# Patient Record
(demographics unavailable — no encounter records)

---

## 2024-10-14 NOTE — ASSESSMENT
[FreeTextEntry1] : 74 year old M with HTN, HLD, Afib (likely long-standing persistent as per pt), CAD s/p stent in 2014, HFpEF, DM who presents for f/u.  1) HFpEF, - His initial presentation was likely acute on chronic HFpEF exacerbation related to diuretic underdosing +/- slow Afib and significant valvular disease. His severe TR and moderate MR are likely due to annular dilation in setting of longstanding Afib. His symptoms/volume status have improved with Lasix/aldactone and stopping of nebivolol. - TTE 12/14/23 showed normal LVEF 65%, RV enlargement with probably normal function, biatrial enlargement, mild-mod AI, moderate MR, and severe TR with PASP 39mmHg. - Continue Lasix 40mg with spironolactone 25mg daily - Given bradycardia, continue to hold nebivolol to allow for increased HR to generate more forward flow - GDMT for HFpEF: Aldactone 25mg daily, Farxiga 10mg daily. He is on Losartan 100mg daily. - Continue amlodipine 5mg daily for HTN  2) Afib, likely longstanding persistent 3) CAD s/p stent (in 2014, last [facility] 2023 reportedly non-obstructive with Dr. Morel) - On Eliquis 5mg PO BID for stroke prevention - 1 week event monitor showed 100% Afib, 33-96, avg 59 bpm, 6 pauses noted (longest 2.8 s in Afib), rare PVCs - Continue to hold Nebivolol as above - He is asymptomatic without SOB or palpitations. His volume status is well-controlled. Will consider EP evaluation in the future. - Continue ASA 81mg daily and pitavastatin 2mg daily  4) Follow-up, 3 months or sooner if symptomatic

## 2024-10-14 NOTE — HISTORY OF PRESENT ILLNESS
[FreeTextEntry1] : Pt remains stable since last visit. No CP, SOB, LE edema, orthopnea, PND, palpitations, dizziness, or LOC. Still on Lasix 40/spironolactone 25. BUN/Cr 42/1.4 which is stable.  7/15/24 - Pt remains stable for the past 3 months. No CP, SOB, LE edema, orthopnea, PND, palpitations, dizziness, or LOC. He feels well overall on Lasix 40/cyndee 25. He tried reducing Lasix to 20mg in the past but that led to increase in edema.  4/15/24 - He has been stable for the last 2 months. He is on Lasix 40/spironolactone 25 daily. He reports minimal LE swelling. His weight is stable. He denies CP or SOB. He feels like he can walk at least 1 hour with his cane now, which is much improved from before. No palpitations, dizziness, or LOC.  2/21/24 - He has been eating more salty foods during [place]. He was taking Lasix 20 PO daily and spironolactone 12.5mg daily. He noticed his weight climbing and his LE edema increasing so he increased Lasix back to 40mg and spironolactone back to 25mg daily for the past 10 days. He noticed improvement of swelling since then. His weight today is 153-154 lbs. He has no chest pain, SOB, orthopnea or PND.  1/24/24 - CMP with K 6.1, BUN/Cr 105/2.34, proBNP still elevated 2006. Pt was advised to stop KCl tablets and hold spironolactone x 2 days and continue Lasix 20mg PO daily. Pt feels okay today without CP or SOB. His weight is 148 lbs today. No palpitations, dizziness, or LOC.  1/19/24 - He is back to his baseline exercise tolerance, walking around slowly with a cane without chest pain or SOB. He is now 147 lbs down from 152 lbs last visit and 160 lbs from initial visit. No orthopnea, PND, dizziness, or LOC.  12/21/23 - He feels much improved since increasing Lasix to 40mg PO daily and stopping nebivolol. He lost 10 lbs with less abdominal and LE swelling. He denies SOB, orthopnea, PND. He now has trace LE edema.  12/14/23 -Pt was hospitalized 4/19/23-4/24/23 for SOB. He reportedly had stress test with Dr. Morel at that time and became SOB. EKG showed Afib with bradycardia. Pt was treated for ADHF. He was given Lasix. TTE at that time showed EF 65-70%, LAE/AVIS enlargement, moderate MR/TR/ME but normal pulmonary pressure. Cardiology recommended to stop Plavix given stent in 2014 and stop bystolic due to slow Afib.  Since discharge, pt states he saw Dr. Morel once and underwent LHC and was told there were no new blockages. He last saw PCP 2 months ago who reduced Lasix to 20mg PO daily due to CKD. He was advised to see Arben Rehman (hepatology) who told him to come to cardiology for further evaluation as he has significant swelling. He reports LE edema and abdominal bloating worsening over the past 2 weeks. He states he was stable when he was on Lasix 40mg PO daily. He has no chest pain. He reports orthopnea but no PND. No dizziness, palpitations or LOC.  Discharge meds: Eliquis 5mg PO BID, lasix 40mg PO daily (reduced to 20mg PO daily 1.5 months ago), spironolactone 25mg daily, amlo 5 daily. He is also on ASA 81, pitavastatin 2mg daily, losartan 100mg daily. He was told to stop nebivolol 10mg daily but is still currently taking it. He is on Farxiga 10mg daily.

## 2024-10-14 NOTE — ASSESSMENT
[FreeTextEntry1] : 74 year old M with HTN, HLD, Afib (likely long-standing persistent as per pt), CAD s/p stent in 2014, HFpEF, DM who presents for f/u.  1) HFpEF, - His initial presentation was likely acute on chronic HFpEF exacerbation related to diuretic underdosing +/- slow Afib and significant valvular disease. His severe TR and moderate MR are likely due to annular dilation in setting of longstanding Afib. His symptoms/volume status have improved with Lasix/aldactone and stopping of nebivolol. - TTE 12/14/23 showed normal LVEF 65%, RV enlargement with probably normal function, biatrial enlargement, mild-mod AI, moderate MR, and severe TR with PASP 39mmHg. - Continue Lasix 40mg with spironolactone 25mg daily - Given bradycardia, continue to hold nebivolol to allow for increased HR to generate more forward flow - GDMT for HFpEF: Aldactone 25mg daily, Farxiga 10mg daily. He is on Losartan 100mg daily. - Continue amlodipine 5mg daily for HTN  2) Afib, likely longstanding persistent 3) CAD s/p stent (in 2014, last [facility] 2023 reportedly non-obstructive with Dr. Morel) - On Eliquis 5mg PO BID for stroke prevention - 1 week event monitor showed 100% Afib, 33-96, avg 59 bpm, 6 pauses noted (longest 2.8 s in Afib), rare PVCs - Continue to hold Nebivolol as above - He is asymptomatic without SOB or palpitations. His volume status is well-controlled. Will consider EP evaluation in the future. - Continue ASA 81mg daily and pitavastatin 2mg daily  4) Follow-up, 3 months or sooner if symptomatic

## 2024-10-14 NOTE — REASON FOR VISIT
[Symptom and Test Evaluation] : symptom and test evaluation [Cardiac Failure] : cardiac failure [Arrhythmia/ECG Abnorrmalities] : arrhythmia/ECG abnormalities [FreeTextEntry1] : 74 year old M with HTN, HLD, Afib (likely long-standing persistent as per pt), CAD s/p stent in 2014, HFpEF, DM who presents for f/u.  Meds: Eliquis 5mg PO BID, Lasix 40mg PO daily, spironolactone 25mg daily, amlo 5 daily, losartan 100mg daily, Farxiga 10mg daily. Nebivolol has been HELD. He is also on ASA 81, pitavastatin 2mg daily.

## 2024-10-14 NOTE — PHYSICAL EXAM

## 2024-10-14 NOTE — PHYSICAL EXAM

## 2025-01-13 NOTE — HISTORY OF PRESENT ILLNESS
[FreeTextEntry1] : He remains stable. He reports good exercise tolerance. He is still on Lasix 40/spironolactone 25 daily with stable renal function. No CP, SOB, LE edema, orthopnea, PND, palpitations, dizziness, or LOC.  10/14/24 - Pt remains stable since last visit. No CP, SOB, LE edema, orthopnea, PND, palpitations, dizziness, or LOC. Still on Lasix 40/spironolactone 25. BUN/Cr 42/1.4 which is stable.  7/15/24 - Pt remains stable for the past 3 months. No CP, SOB, LE edema, orthopnea, PND, palpitations, dizziness, or LOC. He feels well overall on Lasix 40/cyndee 25. He tried reducing Lasix to 20mg in the past but that led to increase in edema.  4/15/24 - He has been stable for the last 2 months. He is on Lasix 40/spironolactone 25 daily. He reports minimal LE swelling. His weight is stable. He denies CP or SOB. He feels like he can walk at least 1 hour with his cane now, which is much improved from before. No palpitations, dizziness, or LOC.  2/21/24 - He has been eating more salty foods during [place]. He was taking Lasix 20 PO daily and spironolactone 12.5mg daily. He noticed his weight climbing and his LE edema increasing so he increased Lasix back to 40mg and spironolactone back to 25mg daily for the past 10 days. He noticed improvement of swelling since then. His weight today is 153-154 lbs. He has no chest pain, SOB, orthopnea or PND.  1/24/24 - CMP with K 6.1, BUN/Cr 105/2.34, proBNP still elevated 2006. Pt was advised to stop KCl tablets and hold spironolactone x 2 days and continue Lasix 20mg PO daily. Pt feels okay today without CP or SOB. His weight is 148 lbs today. No palpitations, dizziness, or LOC.  1/19/24 - He is back to his baseline exercise tolerance, walking around slowly with a cane without chest pain or SOB. He is now 147 lbs down from 152 lbs last visit and 160 lbs from initial visit. No orthopnea, PND, dizziness, or LOC.  12/21/23 - He feels much improved since increasing Lasix to 40mg PO daily and stopping nebivolol. He lost 10 lbs with less abdominal and LE swelling. He denies SOB, orthopnea, PND. He now has trace LE edema.  12/14/23 -Pt was hospitalized 4/19/23-4/24/23 for SOB. He reportedly had stress test with Dr. Morel at that time and became SOB. EKG showed Afib with bradycardia. Pt was treated for ADHF. He was given Lasix. TTE at that time showed EF 65-70%, LAE/AVIS enlargement, moderate MR/TR/CT but normal pulmonary pressure. Cardiology recommended to stop Plavix given stent in 2014 and stop bystolic due to slow Afib.  Since discharge, pt states he saw Dr. Morel once and underwent LHC and was told there were no new blockages. He last saw PCP 2 months ago who reduced Lasix to 20mg PO daily due to CKD. He was advised to see Arben Rehman (hepatology) who told him to come to cardiology for further evaluation as he has significant swelling. He reports LE edema and abdominal bloating worsening over the past 2 weeks. He states he was stable when he was on Lasix 40mg PO daily. He has no chest pain. He reports orthopnea but no PND. No dizziness, palpitations or LOC.  Discharge meds: Eliquis 5mg PO BID, lasix 40mg PO daily (reduced to 20mg PO daily 1.5 months ago), spironolactone 25mg daily, amlo 5 daily. He is also on ASA 81, pitavastatin 2mg daily, losartan 100mg daily. He was told to stop nebivolol 10mg daily but is still currently taking it. He is on Farxiga 10mg daily.

## 2025-01-13 NOTE — PHYSICAL EXAM

## 2025-01-13 NOTE — REASON FOR VISIT
[Arrhythmia/ECG Abnorrmalities] : arrhythmia/ECG abnormalities [Cardiac Failure] : cardiac failure [Hyperlipidemia] : hyperlipidemia [Hypertension] : hypertension [Coronary Artery Disease] : coronary artery disease [FreeTextEntry1] : 74 year old M with HTN, HLD, Afib (likely long-standing persistent as per pt), CAD s/p stent in 2014, HFpEF, DM who presents for f/u.  Meds: Eliquis 5mg PO BID, Lasix 40mg PO daily, spironolactone 25mg daily, amlo 5 daily, losartan 100mg daily, Farxiga 10mg daily. Nebivolol has been HELD. He is also on ASA 81, pitavastatin 2mg daily.

## 2025-01-13 NOTE — ASSESSMENT
[FreeTextEntry1] : 74 year old M with HTN, HLD, Afib (likely long-standing persistent as per pt), CAD s/p stent in 2014, HFpEF, DM who presents for f/u.  1) HFpEF, - His initial presentation was likely acute on chronic HFpEF exacerbation related to diuretic underdosing +/- slow Afib and significant valvular disease. His severe TR and moderate MR are likely due to annular dilation in setting of longstanding Afib. His symptoms/volume status have improved with Lasix/aldactone and stopping of nebivolol. - TTE 12/14/23 showed normal LVEF 65%, RV enlargement with probably normal function, biatrial enlargement, mild-mod AI, moderate MR, and severe TR with PASP 39mmHg. - Euvolemic on exam. Continue Lasix 40mg with spironolactone 25mg daily - Given bradycardia, continue to hold nebivolol to allow for increased HR to generate more forward flow - GDMT for HFpEF: Aldactone 25mg daily, Farxiga 10mg daily. He is also on Losartan 100mg daily. - Continue amlodipine 5mg daily for HTN  2) Afib, likely longstanding persistent 3) CAD s/p stent (in 2014, last [facility] 2023 reportedly non-obstructive with Dr. Morel) - Continue Eliquis 5mg PO BID for stroke prevention - 1 week event monitor showed 100% Afib, 33-96, avg 59 bpm, 6 pauses noted (longest 2.8 s in Afib), rare PVCs - Continue to hold Nebivolol as above - He is asymptomatic without SOB or palpitations. Will consider EP evaluation in the future if he becomes symptomatic. - Continue ASA 81mg daily and pitavastatin 2mg daily  4) Follow-up, 3 months

## 2025-04-23 NOTE — ASSESSMENT
[FreeTextEntry1] : 74 year old M with HTN, HLD, Afib (likely long-standing persistent as per pt), CAD s/p stent in 2014, HFpEF, DM who presents for f/u.  1) HFpEF, - His initial presentation was likely acute on chronic HFpEF exacerbation related to diuretic underdosing +/- slow Afib and significant valvular disease. His severe TR and moderate MR are likely due to annular dilation in setting of longstanding Afib. His symptoms/volume status have improved with Lasix/aldactone and stopping of nebivolol. - TTE 12/14/23 showed normal LVEF 65%, RV enlargement with probably normal function, biatrial enlargement, mild-mod AI, moderate MR, and severe TR with PASP 39mmHg. - Euvolemic on exam. Continue Lasix 40mg with spironolactone 25mg daily - Given bradycardia, continue to hold nebivolol to allow for increased HR to generate more forward flow. I advised pt to see pulmonary for sleep study. Pt states he will consider - GDMT for HFpEF: Aldactone 25mg daily, Farxiga 10mg daily - Okay to restart Losartan for microalbumuria. Start at 25mg once daily and titrate up as tolerated - Continue amlodipine 5mg daily for HTN  2) Afib, likely longstanding persistent 3) CAD s/p stent (in 2014, last [facility] 2023 reportedly non-obstructive with Dr. Morel) - Continue Eliquis 5mg PO BID for stroke prevention - 1 week event monitor showed 100% Afib, 33-96, avg 59 bpm, 6 pauses noted (longest 2.8 s in Afib), rare PVCs - Continue to hold Nebivolol as above - He is asymptomatic without SOB or palpitations. Will consider EP evaluation in the future if he becomes symptomatic. - Continue ASA 81mg daily and pitavastatin 2mg daily  4) Follow-up, 3 months

## 2025-04-23 NOTE — PHYSICAL EXAM

## 2025-04-23 NOTE — REASON FOR VISIT
[Symptom and Test Evaluation] : symptom and test evaluation [Arrhythmia/ECG Abnorrmalities] : arrhythmia/ECG abnormalities [Hyperlipidemia] : hyperlipidemia [Hypertension] : hypertension [FreeTextEntry1] : 74 year old M with HTN, HLD, Afib (likely long-standing persistent as per pt), CAD s/p stent in 2014, HFpEF, DM who presents for f/u.  Meds: Eliquis 5mg PO BID, Lasix 40mg PO daily, spironolactone 25mg daily, amlo 5 daily, Farxiga 10mg daily. Nebivolol has been HELD. Losartan 100 was held for the past year. He is also on ASA 81, pitavastatin 2mg daily.

## 2025-04-23 NOTE — HISTORY OF PRESENT ILLNESS
[FreeTextEntry1] : Pt feels stable since last visit with good exercise tolerance. He does not have CP, SOB, LE edema, orthopnea, PND, palpitations, dizziness, or LOC. He remains on Lasix 40/spironolactone 25mg daily with stable renal function. He does have microalbuminuria. He reportedly has been OFF losartan. PCP advised pt to restart it.  1/13/25 - He remains stable. He reports good exercise tolerance. He is still on Lasix 40/spironolactone 25 daily with stable renal function. No CP, SOB, LE edema, orthopnea, PND, palpitations, dizziness, or LOC.  10/14/24 - Pt remains stable since last visit. No CP, SOB, LE edema, orthopnea, PND, palpitations, dizziness, or LOC. Still on Lasix 40/spironolactone 25. BUN/Cr 42/1.4 which is stable.  7/15/24 - Pt remains stable for the past 3 months. No CP, SOB, LE edema, orthopnea, PND, palpitations, dizziness, or LOC. He feels well overall on Lasix 40/cyndee 25. He tried reducing Lasix to 20mg in the past but that led to increase in edema.  4/15/24 - He has been stable for the last 2 months. He is on Lasix 40/spironolactone 25 daily. He reports minimal LE swelling. His weight is stable. He denies CP or SOB. He feels like he can walk at least 1 hour with his cane now, which is much improved from before. No palpitations, dizziness, or LOC.  2/21/24 - He has been eating more salty foods during [place]. He was taking Lasix 20 PO daily and spironolactone 12.5mg daily. He noticed his weight climbing and his LE edema increasing so he increased Lasix back to 40mg and spironolactone back to 25mg daily for the past 10 days. He noticed improvement of swelling since then. His weight today is 153-154 lbs. He has no chest pain, SOB, orthopnea or PND.  1/24/24 - CMP with K 6.1, BUN/Cr 105/2.34, proBNP still elevated 2006. Pt was advised to stop KCl tablets and hold spironolactone x 2 days and continue Lasix 20mg PO daily. Pt feels okay today without CP or SOB. His weight is 148 lbs today. No palpitations, dizziness, or LOC.  1/19/24 - He is back to his baseline exercise tolerance, walking around slowly with a cane without chest pain or SOB. He is now 147 lbs down from 152 lbs last visit and 160 lbs from initial visit. No orthopnea, PND, dizziness, or LOC.  12/21/23 - He feels much improved since increasing Lasix to 40mg PO daily and stopping nebivolol. He lost 10 lbs with less abdominal and LE swelling. He denies SOB, orthopnea, PND. He now has trace LE edema.  12/14/23 -Pt was hospitalized 4/19/23-4/24/23 for SOB. He reportedly had stress test with Dr. Morel at that time and became SOB. EKG showed Afib with bradycardia. Pt was treated for ADHF. He was given Lasix. TTE at that time showed EF 65-70%, LAE/AVIS enlargement, moderate MR/TR/CO but normal pulmonary pressure. Cardiology recommended to stop Plavix given stent in 2014 and stop bystolic due to slow Afib.  Since discharge, pt states he saw Dr. Morel once and underwent LHC and was told there were no new blockages. He last saw PCP 2 months ago who reduced Lasix to 20mg PO daily due to CKD. He was advised to see Arben Rehman (hepatology) who told him to come to cardiology for further evaluation as he has significant swelling. He reports LE edema and abdominal bloating worsening over the past 2 weeks. He states he was stable when he was on Lasix 40mg PO daily. He has no chest pain. He reports orthopnea but no PND. No dizziness, palpitations or LOC.  Discharge meds: Eliquis 5mg PO BID, lasix 40mg PO daily (reduced to 20mg PO daily 1.5 months ago), spironolactone 25mg daily, amlo 5 daily. He is also on ASA 81, pitavastatin 2mg daily, losartan 100mg daily. He was told to stop nebivolol 10mg daily but is still currently taking it. He is on Farxiga 10mg daily.

## 2025-07-16 NOTE — HISTORY OF PRESENT ILLNESS
[FreeTextEntry1] : Pt reports feeling stable since last visit. PCP dc'ed amlodipine as BPs were normal. Labs were checked showing Na 131 and BUN/Cr 61/1.6, slightly worse than prior. He remains on Lasix 40/spironolactone 20 along with Losartan 25 which was added last visit. He is otherwise asymptomatic. He denies CP, SOB, LE edema, orthopnea, PND, palpitations, dizziness, or LOC.  4/23/25 - Pt feels stable since last visit with good exercise tolerance. He does not have CP, SOB, LE edema, orthopnea, PND, palpitations, dizziness, or LOC. He remains on Lasix 40/spironolactone 25mg daily with stable renal function. He does have microalbuminuria. He reportedly has been OFF losartan. PCP advised pt to restart it.  1/13/25 - He remains stable. He reports good exercise tolerance. He is still on Lasix 40/spironolactone 25 daily with stable renal function. No CP, SOB, LE edema, orthopnea, PND, palpitations, dizziness, or LOC.  10/14/24 - Pt remains stable since last visit. No CP, SOB, LE edema, orthopnea, PND, palpitations, dizziness, or LOC. Still on Lasix 40/spironolactone 25. BUN/Cr 42/1.4 which is stable.  7/15/24 - Pt remains stable for the past 3 months. No CP, SOB, LE edema, orthopnea, PND, palpitations, dizziness, or LOC. He feels well overall on Lasix 40/cyndee 25. He tried reducing Lasix to 20mg in the past but that led to increase in edema.  4/15/24 - He has been stable for the last 2 months. He is on Lasix 40/spironolactone 25 daily. He reports minimal LE swelling. His weight is stable. He denies CP or SOB. He feels like he can walk at least 1 hour with his cane now, which is much improved from before. No palpitations, dizziness, or LOC.  2/21/24 - He has been eating more salty foods during [place]. He was taking Lasix 20 PO daily and spironolactone 12.5mg daily. He noticed his weight climbing and his LE edema increasing so he increased Lasix back to 40mg and spironolactone back to 25mg daily for the past 10 days. He noticed improvement of swelling since then. His weight today is 153-154 lbs. He has no chest pain, SOB, orthopnea or PND.  1/24/24 - CMP with K 6.1, BUN/Cr 105/2.34, proBNP still elevated 2006. Pt was advised to stop KCl tablets and hold spironolactone x 2 days and continue Lasix 20mg PO daily. Pt feels okay today without CP or SOB. His weight is 148 lbs today. No palpitations, dizziness, or LOC.  1/19/24 - He is back to his baseline exercise tolerance, walking around slowly with a cane without chest pain or SOB. He is now 147 lbs down from 152 lbs last visit and 160 lbs from initial visit. No orthopnea, PND, dizziness, or LOC.  12/21/23 - He feels much improved since increasing Lasix to 40mg PO daily and stopping nebivolol. He lost 10 lbs with less abdominal and LE swelling. He denies SOB, orthopnea, PND. He now has trace LE edema.  12/14/23 -Pt was hospitalized 4/19/23-4/24/23 for SOB. He reportedly had stress test with Dr. Morel at that time and became SOB. EKG showed Afib with bradycardia. Pt was treated for ADHF. He was given Lasix. TTE at that time showed EF 65-70%, LAE/AVIS enlargement, moderate MR/TR/AR but normal pulmonary pressure. Cardiology recommended to stop Plavix given stent in 2014 and stop bystolic due to slow Afib.  Since discharge, pt states he saw Dr. Morel once and underwent LHC and was told there were no new blockages. He last saw PCP 2 months ago who reduced Lasix to 20mg PO daily due to CKD. He was advised to see Arben Rehman (hepatology) who told him to come to cardiology for further evaluation as he has significant swelling. He reports LE edema and abdominal bloating worsening over the past 2 weeks. He states he was stable when he was on Lasix 40mg PO daily. He has no chest pain. He reports orthopnea but no PND. No dizziness, palpitations or LOC.  Discharge meds: Eliquis 5mg PO BID, lasix 40mg PO daily (reduced to 20mg PO daily 1.5 months ago), spironolactone 25mg daily, amlo 5 daily. He is also on ASA 81, pitavastatin 2mg daily, losartan 100mg daily. He was told to stop nebivolol 10mg daily but is still currently taking it. He is on Farxiga 10mg daily.

## 2025-07-16 NOTE — REASON FOR VISIT
[Cardiac Failure] : cardiac failure [CV Risk Factors and Non-Cardiac Disease] : CV risk factors and non-cardiac disease [Arrhythmia/ECG Abnorrmalities] : arrhythmia/ECG abnormalities [Hyperlipidemia] : hyperlipidemia [Hypertension] : hypertension [FreeTextEntry1] : 74 year old M with HTN, HLD, Afib (likely long-standing persistent as per pt), CAD s/p stent in 2014, HFpEF, DM who presents for f/u.  Meds: Eliquis 5mg PO BID, Lasix 40mg PO daily, spironolactone 25mg daily, Farxiga 10mg daily. Nebivolol has been HELD. Losartan 100 was held for the past year but restarted at Losartan 25mg daily since 4/2025. He is also on ASA 81, pitavastatin 2mg daily.

## 2025-07-16 NOTE — PHYSICAL EXAM

## 2025-07-16 NOTE — ASSESSMENT
[FreeTextEntry1] : 74 year old M with HTN, HLD, Afib (likely long-standing persistent as per pt), CAD s/p stent in 2014, HFpEF, DM who presents for f/u.  1) HFpEF, 2) HTN - His initial presentation was likely acute on chronic HFpEF exacerbation related to diuretic underdosing +/- slow Afib and significant valvular disease. His severe TR and moderate MR are likely due to annular dilation in setting of longstanding Afib. His symptoms/volume status have improved with Lasix/aldactone and stopping of nebivolol. - TTE 12/14/23 showed normal LVEF 65%, RV enlargement with probably normal function, biatrial enlargement, mild-mod AI, moderate MR, and severe TR with PASP 39mmHg. - Euvolemic on exam. Continue Lasix 40mg with spironolactone 25mg daily. Patient became volume overloaded when Lasix/aldactone doses were reduced. - At this time, will hold Losartan for now as Na was normal and Cr was better off Losartan. Repeat labs 1 month. Monitor BPs closely now off Losartan and off amlodipine. - Given bradycardia, continue to hold nebivolol to allow for increased HR to generate more forward flow. I advised pt to see pulmonary for sleep study. Pt states he will consider. - GDMT for HFpEF: Aldactone 25mg daily, Farxiga 10mg daily  3) AFIB, likely longstanding persistent - Continue Eliquis 5mg PO BID for stroke prevention - 1 week event monitor showed 100% Afib, 33-96, avg 59 bpm, 6 pauses noted (longest 2.8 s in Afib), rare PVCs - Continue to hold Nebivolol as above  4) CAD s/p stent (in 2014, last [facility] 2023 reportedly non-obstructive with Dr. Morel) - He is asymptomatic without SOB or palpitations. Will consider EP evaluation in the future if he becomes symptomatic. - Continue ASA 81mg daily and pitavastatin 2mg daily  5) Follow-up, 1 month to repeat bloodwork